# Patient Record
Sex: MALE | URBAN - METROPOLITAN AREA
[De-identification: names, ages, dates, MRNs, and addresses within clinical notes are randomized per-mention and may not be internally consistent; named-entity substitution may affect disease eponyms.]

---

## 2022-05-04 ENCOUNTER — APPOINTMENT (RX ONLY)
Dept: URBAN - METROPOLITAN AREA CLINIC 374 | Facility: CLINIC | Age: 37
Setting detail: DERMATOLOGY
End: 2022-05-04

## 2022-05-04 DIAGNOSIS — L21.8 OTHER SEBORRHEIC DERMATITIS: ICD-10-CM | Status: WORSENING

## 2022-05-04 PROCEDURE — 99204 OFFICE O/P NEW MOD 45 MIN: CPT

## 2022-05-04 PROCEDURE — ? COUNSELING

## 2022-05-04 PROCEDURE — ? RECOMMENDATIONS

## 2022-05-04 PROCEDURE — ? PRESCRIPTION

## 2022-05-04 PROCEDURE — ? MEDICATION COUNSELING

## 2022-05-04 PROCEDURE — ? PRESCRIPTION MEDICATION MANAGEMENT

## 2022-05-04 RX ORDER — HYDROCORTISONE 25 MG/ML
1 LOTION TOPICAL QHS
Qty: 118 | Refills: 2 | Status: ERX | COMMUNITY
Start: 2022-05-04

## 2022-05-04 RX ORDER — KETOCONAZOLE 20 MG/ML
1 SHAMPOO, SUSPENSION TOPICAL
Qty: 120 | Refills: 3 | Status: ERX | COMMUNITY
Start: 2022-05-04

## 2022-05-04 RX ADMIN — HYDROCORTISONE 1: 25 LOTION TOPICAL at 00:00

## 2022-05-04 RX ADMIN — KETOCONAZOLE 1: 20 SHAMPOO, SUSPENSION TOPICAL at 00:00

## 2022-05-04 ASSESSMENT — LOCATION SIMPLE DESCRIPTION DERM
LOCATION SIMPLE: LEFT CHEEK
LOCATION SIMPLE: SCALP
LOCATION SIMPLE: RIGHT CHEEK

## 2022-05-04 ASSESSMENT — LOCATION DETAILED DESCRIPTION DERM
LOCATION DETAILED: LEFT CENTRAL MALAR CHEEK
LOCATION DETAILED: RIGHT CENTRAL MALAR CHEEK
LOCATION DETAILED: LEFT SUPERIOR PARIETAL SCALP

## 2022-05-04 ASSESSMENT — LOCATION ZONE DERM
LOCATION ZONE: SCALP
LOCATION ZONE: FACE

## 2022-05-04 NOTE — PROCEDURE: MEDICATION COUNSELING
Xelwichoz Pregnancy And Lactation Text: This medication is Pregnancy Category D and is not considered safe during pregnancy.  The risk during breast feeding is also uncertain.

## 2022-05-04 NOTE — PROCEDURE: PRESCRIPTION MEDICATION MANAGEMENT
Initiate Treatment: ketoconazole 2 % shampoo: Lather into face and scalp at each wash, let sit for 2-3 minutes then rinse out\\nhydrocortisone 2.5 % lotion: Apply a thin layer to scalp QHS PRN itch
Samples Given: Eucerin and Aveeno Moisturizer
Detail Level: Zone
Render In Strict Bullet Format?: No

## 2022-05-04 NOTE — PROCEDURE: RECOMMENDATIONS
Detail Level: Zone
Recommendation Preamble: The following recommendations were made during the visit:
Recommendations (Free Text): increase hair washing to BIW
Render Risk Assessment In Note?: no

## 2022-09-01 ENCOUNTER — TELEPHONE (OUTPATIENT)
Dept: PSYCHIATRY | Facility: CLINIC | Age: 37
End: 2022-09-01

## 2022-09-01 DIAGNOSIS — F31.12: Primary | ICD-10-CM

## 2022-09-01 RX ORDER — CLONIDINE HYDROCHLORIDE 0.1 MG/1
0.1 TABLET ORAL
Qty: 90 TABLET | Refills: 1 | Status: SHIPPED | OUTPATIENT
Start: 2022-09-01 | End: 2022-10-01

## 2022-09-01 NOTE — TELEPHONE ENCOUNTER
Triage  Pt [facility]) chart was reviewed  H/o bipolar disorder, currently with acute karis  Pt called regarding continued limited sleep for past 3 days  Otherwised tried high dose of Depakote 750mg- slight improvement  In the past tried Clonidine up to 0 3mg to 0 6mg, but was stopped by pharmacy due to high dosing and concerns regarding low BP  Although, Clonidine was effective for sleep  Was advised to retry Clonidine at modest dose of 0 2mg to 0 3mg PRN   Discussed risk vs benefit  Encouraged to monitor vital signs

## 2022-09-06 ENCOUNTER — OFFICE VISIT (OUTPATIENT)
Dept: PSYCHIATRY | Facility: CLINIC | Age: 37
End: 2022-09-06
Payer: COMMERCIAL

## 2022-09-06 VITALS — HEART RATE: 78 BPM | DIASTOLIC BLOOD PRESSURE: 87 MMHG | SYSTOLIC BLOOD PRESSURE: 125 MMHG

## 2022-09-06 DIAGNOSIS — F31.9 BIPOLAR I DISORDER (HCC): Primary | ICD-10-CM

## 2022-09-06 PROCEDURE — 99214 OFFICE O/P EST MOD 30 MIN: CPT | Performed by: PSYCHIATRY & NEUROLOGY

## 2022-09-06 RX ORDER — CLONIDINE HYDROCHLORIDE 0.3 MG/1
TABLET ORAL
Qty: 45 TABLET | Refills: 1 | Status: SHIPPED | OUTPATIENT
Start: 2022-09-06

## 2022-09-06 NOTE — PSYCH
[facility]: [address]  Psychiatric Progress Note    MRN#: [id number]   Jarett Coleman 42 y o  This note was not shared with the patient due to reasonable likelihood of causing patient harm     This Patient was seen in the office today at [place] [address] location  Subjective:  Depakote was increased to 750mg  Less racing thoughts , not as depressed  More calm  Altought on 09/01- had "mental breakdown" , was so anxiety could not lay down and sleep  In general mood is more  Sleep is the problem and anxiety   Mood otherwised more leveled  Interim events  Was instructed to increase Clonidine to 0 3mg for sleep ( history of Clonidine 0 6mg causing low BP)  Today , w/o s e  Less anxious  Jomar had mostly complaint of sleep  Without SI, HI  h/o gastric bypass- hence sensitive to medications  Sleep: gets 1-3 hours of sleep on average, within past 3-4 days  Appetite: stable  Medication side effects:none Trazodone - stomach problem  Seroquel not working  ROS: has boot on left leg- was lisa healing fracture        Mental Status Evaluation:  Appearance:  Nile Babinski is a 42 yr old age, appropriate and casually dressed   Wearing a boot on left leg    Behavior:  pleasant, cooperative, good eye contact, mildly anxious   Speech:  Normal  rate, rhythm, volume, latency, amount   Mood:  normal   Affect:  normal   Thought Process:  normal   Thought Content:  no overt delusions   Perceptual Disturbances: no auditory hallucinations, no visual hallucinations  Delusions  No   Risk Potential: Suicidal Ideations No  Homicidal Ideations No  Potential for Aggression No     Sensorium:  person, place, time/date and situation   Memory:  recent and remote memory grossly intact   Consciousness:  alert and awake   Attention: attention span and concentration are age appropriate   Insight:  age appropriate   Judgment: age appropriate   Gait/Station: normal   Motor Activity: no abnormal movements There were no vitals filed for this visit  Medications:   Current Outpatient Medications on File Prior to Visit   Medication Sig Dispense Refill    cloNIDine (CATAPRES) 0 1 mg tablet Take 1 tablet (0 1 mg total) by mouth daily at bedtime 1-3 tablet by mouth at night, PRN 90 tablet 1     No current facility-administered medications on file prior to visit  Labs: I have personally reviewed all pertinent laboratory/tests results  Most Recent Labs: No results found for: WBC, RBC, HGB, HCT, PLT, RDW, NEUTROABS, SODIUM, K, CL, CO2, BUN, CREATININE, GLUC, GLUF, CALCIUM, AST, ALT, ALKPHOS, TP, ALB, TBILI, CHOLESTEROL, HDL, TRIG, LDLCALC, NONHDLC, VALPROICTOT, CARBAMAZEPIN, LITHIUM, AMMONIA, [id number], FREET4, T3FREE, PREGSERUM, HCG, HCGQUANT, RPR, HGBA1C, EAG    __________________________________________________________________________________________________________    IMPRESSION:  Jomar, 42 yr old [place] ([facility]) male, h/o trauma , depression, anxiety, alcohol dependency , prior inpatient; IOP for addiction, was on Vivitrol  Presents as transfer from addiction outpatient to general mental health  Was off medication due to lapse in treatment  Has finding of significant anxiety with manic like features  Serial [facility] appointment with [facility] finding of pressured speech, anxiety- JN like, energetic; with reports of irritability , lack of sleep, distracted -and mind racing thoughts   Duration in totality about > 2wks  Prior trails of Seroquel - ineffective, Haldol-dystonic  Recently started on Clonidine for sleep    Vitals :   BP: 125/87 , pulse 78      DSM5  Bipolar I disorder, current karis, without psychotic features, moderate severe anxious distress  Panic Disorder      PLAN  Discussed diagnostic findings  WNL labs  ( 08/30/22) CBC, CMP, TSH, Lipids, glucose  Plans to increased Depakote , pending level results  Increased Clonidine to 0 4 to 0 5mg  Medications:   Clonidine 0 1mg  Clonidine  3mg  Depakote ER to 500mg + 250mg for acute karis        Risks, benefits, and possible side effects of medications explained to patient and patient verbalizes understanding  Next Appointment: 1wk      Encounter Duration: Time Spent 20  minutes with Patient   Greater than 50% of total time was spent with the patient

## 2022-09-12 ENCOUNTER — OFFICE VISIT (OUTPATIENT)
Dept: PSYCHIATRY | Facility: CLINIC | Age: 37
End: 2022-09-12
Payer: COMMERCIAL

## 2022-09-12 VITALS — HEART RATE: 64 BPM | SYSTOLIC BLOOD PRESSURE: 102 MMHG | DIASTOLIC BLOOD PRESSURE: 58 MMHG

## 2022-09-12 DIAGNOSIS — F31.9 BIPOLAR I DISORDER (HCC): Primary | ICD-10-CM

## 2022-09-12 PROCEDURE — 99211 OFF/OP EST MAY X REQ PHY/QHP: CPT | Performed by: PSYCHIATRY & NEUROLOGY

## 2022-09-12 PROCEDURE — 99213 OFFICE O/P EST LOW 20 MIN: CPT | Performed by: PSYCHIATRY & NEUROLOGY

## 2022-09-12 RX ORDER — DIVALPROEX SODIUM 500 MG/1
TABLET, DELAYED RELEASE ORAL
Qty: 30 TABLET | Refills: 1 | Status: SHIPPED | OUTPATIENT
Start: 2022-09-12

## 2022-09-12 RX ORDER — DIVALPROEX SODIUM 250 MG/1
TABLET, DELAYED RELEASE ORAL
Qty: 30 TABLET | Refills: 1 | Status: SHIPPED | OUTPATIENT
Start: 2022-09-12

## 2022-09-12 NOTE — PATIENT INSTRUCTIONS
Thanks for presenting to today's appointment  Continue medications as prescribed  Jomar safe travels  Plans are when you return to assess for anxiety , monitor bipolar symptoms and to possible start another drug

## 2022-09-12 NOTE — PSYCH
[facility]: [facility]  [address] [facility]    Psychiatric Progress Note  MRN#: [id number]  Jomar Barahona 42 y o  male        This note was not shared with the patient due to reasonable likelihood of causing patient harm   This Patient was seen in the office today at [address] [facility] location  Subjective:  Jomar is sleeping better  Getting about  3 5 to 4 hrs with Clonidine 0 4  Also very hyper  Thoughts were described as worries- and what if   Very anxious at night, and Restless;  fidgets ( occur for about 1-2 hrs)   Otherwise, deviod of drinking  Reported on some stressors- pending divorce, finalized by end of yr  Also he is traveling to [facility] tomorrow  Plans to return w/in 3 5wks  Interested in starting another bipolar medication    Mental Status Evaluation:   General Appearance:  Rolando Jain is a 42 y o  [place] ([facility])  male age appropriate, casually dressed and nondisheveled    Behavior:  pleasant, cooperative, calm, good eye contact   Speech:  hyper-talkative, pressured speech and WNL tone, volume   Mood:  anxious   Affect:  mood-congruent   Thought Process:  normal and logical   Thought Content:  no overt delusions, normal   Perceptual Disturbances: no auditory hallucinations, no visual hallucinations  Delusions  NA   Risk Potential: Suicidal Ideations No  Homicidal Ideations No  Potential for Aggression No     Sensorium:  person, place, time/date and situation   Memory:  recent and remote memory grossly intact   Consciousness:  alert and awake   Attention: attention span and concentration are age appropriate   Insight:  age appropriate and fair   Judgment: age appropriate and fair   Gait/Station: normal   Motor Activity: no abnormal movements     There were no vitals filed for this visit       Medications:   Current Outpatient Medications on File Prior to Visit   Medication Sig Dispense Refill    cloNIDine (CATAPRES) 0 1 mg tablet Take 1 tablet (0 1 mg total) by mouth daily at bedtime 1-3 tablet by mouth at night, PRN 90 tablet 1    cloNIDine (CATAPRES) 0 3 mg tablet 1- 1 5 tablets by mouth per day at night   Discontinued  Clonidine HCL 0 1mg - sent 09/01 45 tablet 1     No current facility-administered medications on file prior to visit  Labs: I have personally reviewed all pertinent laboratory/tests results  WNL labs  ( 08/30/22) CBC, CMP, TSH, Lipids, glucose  ________________________________________________________________  A/P  Jomar, 42 yr old, [place] ([facility]) male, h/o trauma , depression, anxiety, alcohol dependency , prior inpatient; IOP for addiction, was on Vivitrol  Presents as transfer from addiction outpatient to general mental health  Was off medication due to lapse in treatment  Has findings of significant anxiety with manic like features on serial MHOP appointment (pressured speech, anxiety)- lack of sleep, distracted -and racing thoughts  Duration in totality about > 3wks  Prior trails of Seroquel - ineffective, Haldol-dystonic  , Recently, started on Depakote pending level; also increased Clonidine for sleep     Vital   BP:102/58/ pulse 64    DSM5  Bipolar I disorder, current karis, without psychotic features, moderate severe anxious distress  Panic Disorder    PLANS  Discussed diagnostic findings  Pending Depakote level   Continue medications as prescribed  Depakote ER 500mg+ 250mg  Seroquel 400mg  Clonidine to 0 4 to 0 5mg  Risks, benefits, and possible side effects of medications explained to patient and patient verbalizes understanding  Encounter Duration: Time Spent 20 minutes w/Patient   Greater than 50% of total time was spent with the patient

## 2022-12-14 ENCOUNTER — OFFICE VISIT (OUTPATIENT)
Dept: PSYCHIATRY | Facility: CLINIC | Age: 37
End: 2022-12-14

## 2022-12-14 DIAGNOSIS — F32.A DEPRESSION, UNSPECIFIED DEPRESSION TYPE: ICD-10-CM

## 2022-12-14 DIAGNOSIS — F31.9 BIPOLAR I DISORDER (HCC): Primary | ICD-10-CM

## 2022-12-14 RX ORDER — DIVALPROEX SODIUM 500 MG/1
TABLET, DELAYED RELEASE ORAL
Qty: 30 TABLET | Refills: 1 | Status: SHIPPED | OUTPATIENT
Start: 2022-12-14

## 2022-12-14 RX ORDER — TRAZODONE HYDROCHLORIDE 300 MG/1
TABLET ORAL
Qty: 30 TABLET | Refills: 2 | Status: SHIPPED | OUTPATIENT
Start: 2022-12-14

## 2022-12-14 RX ORDER — DIVALPROEX SODIUM 250 MG/1
TABLET, DELAYED RELEASE ORAL
Qty: 30 TABLET | Refills: 1 | Status: SHIPPED | OUTPATIENT
Start: 2022-12-14

## 2022-12-14 RX ORDER — CLONIDINE HYDROCHLORIDE 0.3 MG/1
TABLET ORAL
Qty: 45 TABLET | Refills: 1 | Status: SHIPPED | OUTPATIENT
Start: 2022-12-14

## 2022-12-14 NOTE — PATIENT INSTRUCTIONS
Aubree Tijerina [id number]   Thanks for presenting to today's Appointment at [facility]  Your medications were not changed

## 2022-12-14 NOTE — PSYCH
[facility]: [facility]  [address] [facility]    Psychiatric Progress Note  MRN#: [id number]  Jomar Barahona 42 y o  male        This note was not shared with the patient due to reasonable likelihood of causing patient harm   This Patient was seen in the office today at [place] [address] location  Subjective:  Jomar was late to appointment via 20mins, hence was seen 1 hr later  Jomar reported on increased anxiety since returning from traveling, described more agitation, feeling not grounded , sleep deprived or too hyper  There's some worries , described as him thinking "about the  next day , the next hr, difficult for him to shut off thoughts, less intense if he's completing tasks and staying busy  Otherwise, stated having limited support system  Mood is depressed  Denied SI, although  thoughts of worthlessness, question if he's valuable to society, and purpose  Sleeping about 6 hrs , thinks medications is effective  althought sometime mid night awaken  Slight paranoia thoughts and questioning if things are real or night, was  vague with details, although later stated having some insight about things that are  He's had all the symptoms x 1 5 months   Also was noticeable , reported family commenting about Jomar speech, that he talks rapidly  He was able to state medications as Depakote 500mg + 250mg takes in morning," Trazodone 300mg- some days effectiveness , noticed more effective if with Clonidine  Did not complete Depakote level    Medication Trails:   History of Xanax 05 in the morning, less agitated   Mental Status Evaluation:   General Appearance:  Tasha Bennett is a 42 y o   [facility]  male age appropriate, casually dressed and nondisheveled    Behavior:  cooperative, intense eye contact, mildly anxious , Restlessness   Speech:  hyper-talkative, pressured speech and WNL tone, volume   Mood:  anxious and depressed   Affect:  slight irritability   Thought Process:  circumstantial, disorganized and logical   Thought Content:  no overt delusions, normal   Perceptual Disturbances: no auditory hallucinations, no visual hallucinations   Does not appear preoccupied or responding  Delusions  NA   Risk Potential: Suicidal Ideations No  Homicidal Ideations No  Potential for Aggression No     Sensorium:  person, place, time/date and situation   Memory:  recent and remote memory grossly intact   Consciousness:  alert and awake   Attention: attention span and concentration are age appropriate   Insight:  fair   Judgment: fair   Gait/Station: normal   Motor Activity: no abnormal movements     There were no vitals filed for this visit  Medications:   Current Outpatient Medications on File Prior to Visit   Medication Sig Dispense Refill   • cloNIDine (CATAPRES) 0 1 mg tablet Take 1 tablet (0 1 mg total) by mouth daily at bedtime 1-3 tablet by mouth at night, PRN 90 tablet 1   • cloNIDine (CATAPRES) 0 3 mg tablet 1- 1 5 tablets by mouth per day at night   Discontinued  Clonidine HCL 0 1mg - sent  45 tablet 1   • divalproex sodium (Depakote) 250 mg EC tablet Divalproex Sodium 250m tablet + 500mg by mouth at night 30 tablet 1   • divalproex sodium (Depakote) 500 mg EC tablet Divalproex Sodium 500m tablet + 250mg tablet by mouth at night 30 tablet 1     No current facility-administered medications on file prior to visit  Labs: I have personally reviewed all pertinent laboratory/tests results   Most Recent Labs: No results found for: WBC, RBC, HGB, HCT, PLT, RDW, NEUTROABS, SODIUM, K, CL, CO2, BUN, CREATININE, GLUC, GLUF, CALCIUM, AST, ALT, ALKPHOS, TP, ALB, TBILI, CHOLESTEROL, HDL, TRIG, LDLCALC, NONHDLC, VALPROICTOT, CARBAMAZEPIN, LITHIUM, AMMONIA, [id number], FREET4, T3FREE, PREGSERUM, HCG, HCGQUANT, RPR, HGBA1C, EAG  Tegretol: No results found for: CARBAMAZEPIN     WNL labs  ( 22) CBC, CMP, TSH, Lipids, glucose     ___________________________________________________________________________________________________________________  A/P  Jomar, 42 yr old, [place] ([facility]) male, h/o trauma , depression, anxiety, alcohol dependency , prior inpatient; IOP for addiction; transfer s/p completion of program  Presented with several bipolar karis symptoms ( pressured, insomnia, distractibility, anxious, irritability , hyper- Note with similar finding on serial appointment at [facility]  Today, also with acute karis and generalized worries   Case complicated , as there depressed mood, hopelessness due to limited supports or job pursuits  Medication compliance to Depakote, Trazodone, and  Clonidine, slight improved sleep       DSM5   Unspecified Depression  Bipolar I disorder, current karis, without psychotic features, moderate severe anxious distress  Panic Disorder    PLANS  Discussed diagnostic impression based on clinical findings  Depakote  mg+ 250 mg   Clonidine to 0 4 to 0 5mg  External Records reviewed in PF prior data based - pending Depakote Level- Was informed to talk to [address] [place] [address] regarding sending labs to [facility]      Risks, benefits, and possible side effects of medications explained to patient and patient verbalizes understanding  Next Appointment: 2 wks    Today's Appointment@ [facility]  Face To Face:  2:37 PM -3:18 PM;Documentation Time:  7:15 PM- 7:33 PM    Encounter Duration: Time Spent 41 minutes with Patient  Greater than 50% of total time was spent with the patient     MDM  Number of Diagnoses or Management Options  Bipolar I disorder ([facility] [address] ), current episode karis, without psychotic feature, with moderate severe anxious distress: established, worsening  Depression, unspecified depression type: new, needed workup     Amount and/or Complexity of Data Reviewed  Clinical lab tests: ordered  Review and summarize past medical records: yes    Risk of Complications, Morbidity, and/or Mortality  Presenting problems: moderate  Management options: moderate

## 2022-12-15 ENCOUNTER — SOCIAL WORK (OUTPATIENT)
Dept: BEHAVIORAL/MENTAL HEALTH CLINIC | Facility: CLINIC | Age: 37
End: 2022-12-15

## 2022-12-15 DIAGNOSIS — F33.1 MAJOR DEPRESSIVE DISORDER, RECURRENT, MODERATE (HCC): Primary | ICD-10-CM

## 2022-12-15 NOTE — PSYCH
Psychotherapy Provided: Individual Psychotherapy 53 minutes     Length of time in session: 53  minutes, follow up in 2 week    Data - 117-210pm - The client said that things are going well, he went to [facility] to visit family, stayed about 2 months, near [place]  He said that he was feeling lonely, said that trip helped him mentally and emotionally, helped his parents and felt like he had a routine and purpose  The client said that he is still living alone, he said that he feels like his living situation is not ideal   The client said that he feels like he wakes up in a panic about 2x per week  Worries about his to do list feels like his list is not moving along, things not getting accomplished which produces more negative emotions  Assessment - The client was cooperative during the session and answered therapist’s questions with little prompting  The therapist assessed for self-harm behavior and ideation, there appeared to be none  Plan - The client will schedule future sessions  He will continue to work on understanding his diagnosis, continue to understand how his thoughts influence his emotions, work on building positive self-esteem and self-confidence, and work on reducing negative and unhealthy thoughts, patterns and learning to choose more healthy responses and behaviors, use and improve his coping skills, challenge negative thoughts, practice reframing, and continue to work on improving positive and effective communication to reduce and resolve conflict  No diagnosis found  Goals addressed in session: Goal 1     Pain:      none    0    Current suicide risk : Moderate    3    Behavioral Health Treatment Plan  Luke: Diagnosis and Treatment Plan explained to Debby Brownlee relates understanding diagnosis and is agreeable to Treatment Plan   Yes     Visit start and stop times:    12/15/22

## 2022-12-16 PROBLEM — F33.1 MAJOR DEPRESSIVE DISORDER, RECURRENT, MODERATE (HCC): Status: ACTIVE | Noted: 2022-12-16

## 2023-07-05 ENCOUNTER — TELEPHONE (OUTPATIENT)
Dept: PSYCHIATRY | Facility: CLINIC | Age: 38
End: 2023-07-05

## 2023-07-07 ENCOUNTER — TELEPHONE (OUTPATIENT)
Dept: PSYCHIATRY | Facility: CLINIC | Age: 38
End: 2023-07-07

## 2023-07-07 NOTE — TELEPHONE ENCOUNTER
Patient called to inquire about scheduling appt with Psychiatry. Writer advised that the doctor's schedule is currently not available as she is returning from leave. Routed message to MR for scheduling once the doctor's schedule is available.

## 2023-07-11 ENCOUNTER — DOCUMENTATION (OUTPATIENT)
Dept: BEHAVIORAL/MENTAL HEALTH CLINIC | Facility: CLINIC | Age: 38
End: 2023-07-11

## 2023-07-11 ENCOUNTER — TELEPHONE (OUTPATIENT)
Dept: PSYCHIATRY | Facility: CLINIC | Age: 38
End: 2023-07-11

## 2023-07-11 NOTE — PROGRESS NOTES
[address] [facility]    Patient Name Tejinder Stone     Date of Birth: 34 y.o. 1985      MRN: [id number]    Admission Date: several months ago    Date of Transfer: July 11, 2023    Admission Diagnosis:     1. Major Depressive Disorder    Current Diagnosis:     No diagnosis found. Reason for Admission: Jomar presented for treatment due to depressive symptoms. Primary complaints included DEPRESSIVE SYMPTOMS: depressed mood. Sadness, lack of motivation. Progress in Treatment: Jomar was seen for Cognitive Behavioral Therapy. During the course of treatment he NA. Episodes of Higher Level of Care: No    Transfer request Initiated by: Psychiatrist: None Therapist: None    Reason for Transfer Request: patient requested transfer    Does this individual need a clinician with specialized training/expertise?: No    Is this client working with any other [facility] Providers/Therapists?  Psychiatrist: None and Dr. Dr. Carla Dasilva Therapist: None    Other pertinent issues: None    Are there any specific individuals who would be a “best fit” or who have already agreed to accept this transfer request?      Psychiatrist: None   Therapist: None  Rationale: Not Applicable    Attempts to maintain the current therapeutic relationship: Yes, but patient insists on transfer    Transfer request routed to Clinical Supervisor for input and/or approval.     Comments from other involved providers and/or clinical coordinator: None    Kayli Stanley, PC07/11/23

## 2023-07-11 NOTE — TELEPHONE ENCOUNTER
Client requested transfer from [facility]. Rec'd transfer summary documentation.  Scheduled KRISTINA with Joe Tuttle for 7/12 at 10am.

## 2023-07-12 ENCOUNTER — OFFICE VISIT (OUTPATIENT)
Dept: PSYCHIATRY | Facility: CLINIC | Age: 38
End: 2023-07-12
Payer: COMMERCIAL

## 2023-07-12 ENCOUNTER — SOCIAL WORK (OUTPATIENT)
Dept: BEHAVIORAL/MENTAL HEALTH CLINIC | Facility: CLINIC | Age: 38
End: 2023-07-12
Payer: COMMERCIAL

## 2023-07-12 DIAGNOSIS — F32.A DEPRESSION, UNSPECIFIED DEPRESSION TYPE: ICD-10-CM

## 2023-07-12 DIAGNOSIS — F41.9 ANXIETY DISORDER, UNSPECIFIED TYPE: Primary | ICD-10-CM

## 2023-07-12 DIAGNOSIS — F31.9 BIPOLAR I DISORDER (HCC): ICD-10-CM

## 2023-07-12 DIAGNOSIS — Z79.899 ENCOUNTER FOR LONG-TERM (CURRENT) USE OF OTHER MEDICATIONS: Primary | ICD-10-CM

## 2023-07-12 DIAGNOSIS — F43.20 ADJUSTMENT DISORDER, UNSPECIFIED TYPE: ICD-10-CM

## 2023-07-12 DIAGNOSIS — G47.9 SLEEP DIFFICULTIES: ICD-10-CM

## 2023-07-12 PROCEDURE — 99213 OFFICE O/P EST LOW 20 MIN: CPT | Performed by: PSYCHIATRY & NEUROLOGY

## 2023-07-12 PROCEDURE — 90834 PSYTX W PT 45 MINUTES: CPT

## 2023-07-12 RX ORDER — TRAZODONE HYDROCHLORIDE 300 MG/1
TABLET ORAL
Qty: 30 TABLET | Refills: 2 | Status: SHIPPED | OUTPATIENT
Start: 2023-07-12

## 2023-07-12 RX ORDER — DIVALPROEX SODIUM 500 MG/1
TABLET, DELAYED RELEASE ORAL
Qty: 30 TABLET | Refills: 1 | Status: SHIPPED | OUTPATIENT
Start: 2023-07-12

## 2023-07-12 RX ORDER — DIVALPROEX SODIUM 250 MG/1
TABLET, DELAYED RELEASE ORAL
Qty: 30 TABLET | Refills: 1 | Status: SHIPPED | OUTPATIENT
Start: 2023-07-12

## 2023-07-12 RX ORDER — LURASIDONE HYDROCHLORIDE 20 MG/1
20 TABLET, FILM COATED ORAL
Qty: 30 TABLET | Refills: 1 | Status: SHIPPED | OUTPATIENT
Start: 2023-07-12 | End: 2023-07-13 | Stop reason: SDUPTHER

## 2023-07-12 NOTE — PSYCH
[facility]: [facility], [address] [facility]      Psychiatric Progress Note  MRN#: [id number]  Jomar Barahona 34 y.o. male  This note was not shared with the patient due to reasonable likelihood of causing patient harm   _________________________________________________________________________________________________________________________________  OFFICE APPOINTMENT   Patient was seen today at [address] [facility] location                                                Patient López Cortez ,1985   Prescriber/Physician: Rossy Nunn DO Physician Location:   [address] [facility] [address] [place] [address]     • This service was provided in the office. Patient is currently located in the [place], where I am  licensed. • Patient gave consent to proceed with encounter; acknowledge understanding of security and privacy of encounter   • Patient verbalized understanding evaluation only involves Psychiatric diagnosing, prescribing, result monitoring   • Patient was informed this is a billable service  ___________________________________________________________________________________________________________________________________         Subjective:  Jomar reported recent travel to [facility]. Then stopped Clonidine as was not available , was started on Klonopin . 25mg for anxiety, which he has discontinued ~ 2 wks ago. Currently asymptomatic. Otherwise reported intermittent anxiety shaking, drug throat, racing thought" and identifiable triggers ( airplanes, public places or while alone). Disclosed depression and lack of sleep as  triggers of anxiety and worries- about loneliness. . Last occurred 2 wks ago, "hopelessness- "why bother", not wanting to eat, multiple days- he did not want to get out of bed. Denies SI, HI. Denies bipolar symptoms since stable dose of Depakote. Medications:  Compliance to Depakote 750mg, Trazodone 300mg. Medication Trails:  Klonopin . 25mg  Clonidine- low BP   Quetiapine -could not sleep  Wellbutrin- " on for a few months- was not working"  Prozac- was not working   Lexapro- worse anxiety       Mental Status Evaluation:   General Appearance:  Dorman Fleischer is a 34 y.o. [place]  male age appropriate, casually dressed and nondisheveled    Behavior:  cooperative, intense eye contact, mildly anxious    Speech:  mildly pressured and WNL tone, volume   Mood:  "stable since Depakote"    Affect:  Slight anxious    Thought Process:  Linear and logical   Thought Content:  no overt delusions, normal   Perceptual Disturbances: no auditory hallucinations, no visual hallucinations . Does not appear preoccupied or responding  Delusions  NA   Risk Potential: Suicidal Ideations No  Homicidal Ideations No  Potential for Aggression No     Sensorium:  person, place, time/date and situation   Memory:  recent and remote memory grossly intact   Consciousness:  alert and awake   Attention: attention span and concentration are age appropriate   Insight:  fair   Judgment: fair   Gait/Station: normal   Motor Activity: no abnormal movements     There were no vitals filed for this visit. Medications:     Current Outpatient Medications on File Prior to Visit   Medication Sig Dispense Refill   • cloNIDine (CATAPRES) 0.1 mg tablet Take 1 tablet (0.1 mg total) by mouth daily at bedtime 1-3 tablet by mouth at night, PRN 90 tablet 1   • cloNIDine (CATAPRES) 0.3 mg tablet Clonidine 0.3m- 1.5 tablets by mouth per day at night .  45 tablet 1   • divalproex sodium (Depakote) 250 mg EC tablet Divalproex Sodium 250m tablet + 500mg po at night 30 tablet 1   • divalproex sodium (Depakote) 500 mg EC tablet Divalproex Sodium 500m tablet + 250mg tablet po at night 30 tablet 1   • traZODone (DESYREL) 300 MG tablet Trazodone 300m tablet po at night 30 tablet 2     No current facility-administered medications on file prior to visit. Labs        WNL labs  ( 22) CBC, CMP, TSH, Lipids, glucose     ___________________________________________________________________________________________________________________  A/P  Jomar, 34 yr old, [place] male, h/o trauma , depression, anxiety, alcohol dependency , prior inpatient; IOP for addiction; transfer s/p completion of program. Presented with several bipolar karis symptoms ( pressured, insomnia, distractibility, anxious, irritability , hyper- Note with similar finding on serial appointment at [facility]. Today,less karis since Depakote increased, otherwise intermittent depression- not meeting full criteria.       DSM5   Unspecified Depression  Bipolar I disorder, current karis, without psychotic features, moderate severe anxious distress  Panic Disorder    PLANS  Discussed diagnostic impression based on clinical findings  Started Latuda 20mg  Continue Depakote  mg+ 250 mg   Continue Trazodone  D/C Clonidine to 0.4 to 0.5mg. PRN      Current Medications Ordered :   •  divalproex sodium (Depakote) 250 mg DR tablet, Divalproex Sodium 250m tablet + 500mg po at night, Disp: 30 tablet, Rfl: 1  •  divalproex sodium (Depakote) 500 mg DR tablet, Divalproex Sodium 500m tablet + 250mg tablet po at night, Disp: 30 tablet, Rfl: 1  •  lurasidone (Latuda) 20 mg tablet, 1 tablet daily with dinner, Disp: 30 tablet, Rfl: 1  •  traZODone (DESYREL) 300 MG tablet, Trazodone 300m tablet po at night, Disp: 30 tablet, Rfl: 2    Orders Placed This Encounter   Procedures   • CBC and differential     This is a patient instruction: This test is non-fasting. Please drink two glasses of water morning of bloodwork. Standing Status:   Future     Number of Occurrences:   1     Standing Expiration Date:   10/12/2023   • Comprehensive metabolic panel     This is a patient instruction: Patient fasting for 8 hours or longer recommended. Standing Status:   Future     Number of Occurrences:   1     Standing Expiration Date:   7/12/2024   • Lipid panel     This is a patient instruction: This test requires patient fasting for 10-12 hours or longer. Drinking of black coffee or black tea is acceptable. Standing Status:   Future     Number of Occurrences:   1     Standing Expiration Date:   10/12/2023   • Ammonia     This is a patient instruction: This test can ONLY be collected at a [facility]. Cannot be collected at the [facility] campus location. Standing Status:   Future     Number of Occurrences:   1     Standing Expiration Date:   10/12/2023   • Valproic acid level, total     Trough level : get level in the morning     Standing Status:   Future     Number of Occurrences:   1     Standing Expiration Date:   1/12/2024         Risks, benefits, and possible side effects of medications explained to patient and patient verbalizes understanding. Next Appointment: 6 wks    [facility]  Face To Face:  11:30 AM -11:50AM;Documentation Time:  12:38 PM- 12:56 PM    Encounter Duration: Time Spent 20 minutes with Patient. Greater than 50% of total time was spent with the patient     MDM  Number of Diagnoses or Management Options  Bipolar I disorder ([address]), current episode karis, without psychotic feature, with moderate severe anxious distress: established, improving  Depression, unspecified depression type: established, worsening  Encounter for long-term (current) use of other medications: new, no workup     Amount and/or Complexity of Data Reviewed  Clinical lab tests: ordered    Risk of Complications, Morbidity, and/or Mortality  Presenting problems: moderate  Diagnostic procedures: moderate  Management options: moderate

## 2023-07-12 NOTE — PATIENT INSTRUCTIONS
Inessa Nguyen [phone] Thanks for presenting to today's Appointment at [address] and Your medications were changed as listed - started [place]

## 2023-07-13 ENCOUNTER — TELEPHONE (OUTPATIENT)
Dept: PSYCHIATRY | Facility: CLINIC | Age: 38
End: 2023-07-13

## 2023-07-13 DIAGNOSIS — F31.9 BIPOLAR I DISORDER (HCC): ICD-10-CM

## 2023-07-13 DIAGNOSIS — F32.A DEPRESSION, UNSPECIFIED DEPRESSION TYPE: ICD-10-CM

## 2023-07-13 RX ORDER — LURASIDONE HYDROCHLORIDE 20 MG/1
TABLET, FILM COATED ORAL
Qty: 30 TABLET | Refills: 1 | Status: SHIPPED | OUTPATIENT
Start: 2023-07-13

## 2023-07-13 NOTE — TELEPHONE ENCOUNTER
Pharmacy calling regarding lurasidone script with conflicting directions. Script states both to take 1 in the morning with breakfast and 1 with dinner. Pharmacy states they wont fill the script until they get clarification on which set of directions is correct.

## 2023-07-18 NOTE — PSYCH
Behavioral Health Psychotherapy Progress Note    Psychotherapy Provided: Individual Psychotherapy     1. Major depressive disorder, recurrent, moderate (HCC)        2. Adjustment disorder, unspecified type            DATA: Client identified feeling overwhelmed by anxiety in the evening accompanied by difficulty falling asleep. The client stated that he is looking for support with managing his anxiety and improving his sleep quality. The client identified that his anxiety activates when he is winding down from his day, and it presents itself through racing thoughts. Client identified that keeping busy helps limit his anxious symptoms and increases his productivity. He identified that help from the therapist by setting goals for each session has helped in the past.   During this session, this clinician used the following therapeutic modalities: Engagement Strategies, Cognitive Behavioral Therapy, Motivational Interviewing and Supportive Psychotherapy    Substance Abuse was not addressed during this session. If the client is diagnosed with a co-occurring substance use disorder, please indicate any changes in the frequency or amount of use: none. Stage of change for addressing substance use diagnoses: No substance use/Not applicable    ASSESSMENT:  López Cortez presents with a Anxious and tired mood. his affect is Constricted, which is congruent, with his mood and the content of the session. López Cortez presents with a minimal risk of suicide, minimal risk of self-harm, and minimal risk of harm to others. For any risk assessment that surpasses a "low" rating, a safety plan must be developed. A safety plan was indicated: no  If yes, describe in detail: none    PLAN: Between sessions, López Cortez will review psychoeducation on anxiety and goal setting.  At the next session, the therapist will use Engagement Strategies, Cognitive Behavioral Therapy, Motivational Interviewing and Supportive Psychotherapy to develop initial treatment plan with client.     This note was not shared with the patient due to this is a psychotherapy note  Visit start and stop times:    07/18/23  Start Time: 1005  Stop Time: 1053  Total Visit Time: 48 minutes

## 2023-07-24 ENCOUNTER — TELEPHONE (OUTPATIENT)
Dept: PSYCHIATRY | Facility: CLINIC | Age: 38
End: 2023-07-24

## 2023-08-02 ENCOUNTER — SOCIAL WORK (OUTPATIENT)
Dept: BEHAVIORAL/MENTAL HEALTH CLINIC | Facility: CLINIC | Age: 38
End: 2023-08-02
Payer: COMMERCIAL

## 2023-08-02 ENCOUNTER — TELEPHONE (OUTPATIENT)
Dept: PSYCHIATRY | Facility: CLINIC | Age: 38
End: 2023-08-02

## 2023-08-02 DIAGNOSIS — F41.9 ANXIETY DISORDER, UNSPECIFIED TYPE: ICD-10-CM

## 2023-08-02 DIAGNOSIS — G47.9 SLEEP DIFFICULTIES: ICD-10-CM

## 2023-08-02 DIAGNOSIS — F43.20 ADJUSTMENT DISORDER, UNSPECIFIED TYPE: Primary | ICD-10-CM

## 2023-08-02 PROCEDURE — 90834 PSYTX W PT 45 MINUTES: CPT

## 2023-08-02 NOTE — PSYCH
Behavioral Health Psychotherapy Progress Note    Psychotherapy Provided: Individual Psychotherapy     1. Adjustment disorder, unspecified type        2. Anxiety disorder, unspecified type        3. Sleep difficulties            DATA: Session focused on background information on his continued recovery from alcohol, the conditions to living in the recovery house. Client provided background information on his history of low self esteem, anxiety, and the importance of socialization in his life. Client identified his primary goals for his initial treatment plan were to improve his self esteem and reduce his social anxiety symptoms. During this session, this clinician used the following therapeutic modalities: Engagement Strategies, Cognitive Behavioral Therapy, Motivational Interviewing and Supportive Psychotherapy    Substance Abuse was not addressed during this session. If the client is diagnosed with a co-occurring substance use disorder, please indicate any changes in the frequency or amount of use: None. Stage of change for addressing substance use diagnoses: Maintenance    ASSESSMENT:  Anna Montilla presents with a Euthymic/ normal mood. his affect is Normal range and intensity, which is congruent, with his mood and the content of the session. Anna Montilla presents with a minimal risk of suicide, minimal risk of self-harm, and minimal risk of harm to others. For any risk assessment that surpasses a "low" rating, a safety plan must be developed. A safety plan was indicated: No    If yes, describe in detail N/A    PLAN: Between sessions, Anna Montilla will set a daily schedule to help him to focus on accomplishing his employment goal. At the next session, the therapist will use Engagement Strategies, Cognitive Behavioral Therapy, Motivational Interviewing and Supportive Psychotherapy to address treatment plan goals improving his self esteem and socializing.     This note was not shared with the patient due to this is a psychotherapy note    Visit start and stop times:    08/02/23  Start Time: 1505  Stop Time: 1555  Total Visit Time: 50 minutes

## 2023-08-02 NOTE — TELEPHONE ENCOUNTER
Signed ANDRADE for :    [address]   [address] [place] [address] [address] [id number]  P: [phone]    April Artemio Carrel

## 2023-08-02 NOTE — BH TREATMENT PLAN
[address] [address]  1985     Date of Initial Psychotherapy Assessment: 07/11/2023  Date of Current Treatment Plan: 08/02/23  Treatment Plan Target Date: 02/01/2024  Treatment Plan Expiration Date: 02/01/2024    Diagnosis:   1. Adjustment disorder, unspecified type        2. Anxiety disorder, unspecified type        3. Sleep difficulties            Area(s) of Need: improved self esteem, emotional regulation, and coping skills    Long Term Goal 1 (in the client's own words): " I want to understand my anxiety and learn how to independently manage my emotions."    Stage of Change: Preparation    Target Date for completion: 02/01/2023     Anticipated therapeutic modalities: cognitive behavioral therapy, motivational interviewing, dialectical behavioral therapy, exposure therapy, and behavioral modification. People identified to complete this goal: Jomar Barahona (client)    Short Term Goal 1: Verbalize an accurate understanding of the vicious cycle of social anxiety and avoidance. Objective 1: (identify the means of measuring success in meeting the objective): Discuss with client how social anxiety derives from cognitive biases that overestimate negative evaluation by others, undervalue the self, distress, and often lead to unnecessary avoidances. Objective 2: (identify the means of measuring success in meeting the objective): Assign the client psychoeducation on social anxiety that explains the cycle of social anxiety and avoidance and the rationale for cognitive behavioral treatment.       I am currently under the care of a [facility] psychiatric provider: yes    My [facility] psychiatric provider is: Dr. Yuki Rosales    I am currently taking psychiatric medications: Yes, as prescribed    I feel that I will be ready for discharge from mental health care when I reach the following (measurable goal/objective): understand the causes and triggers for social anxiety and learn effective coping skills. For children and adults who have a legal guardian:   Has there been any change to custody orders and/or guardianship status? NA. If yes, attach updated documentation. I have created my Crisis Plan and have been offered a copy of this plan    [address]: Diagnosis and Treatment Plan explained to [employer] acknowledges an understanding of their diagnosis. Axel Doan agrees to this treatment plan.     I have been offered a copy of this Treatment Plan. yes

## 2023-08-08 ENCOUNTER — TELEPHONE (OUTPATIENT)
Dept: PSYCHIATRY | Facility: CLINIC | Age: 38
End: 2023-08-08

## 2023-08-08 NOTE — PATIENT INSTRUCTIONS
Ethyl Graves [id number]   Thanks for presenting to today's Appointment at [facility]   Depakote was increased to 1000mg at night  Yusra Mary was increased to 40mg     Your other medications were not changed     Also labs order regarding Depakote level - to complete prior to next appointment at [facility]

## 2023-08-08 NOTE — TELEPHONE ENCOUNTER
Zain Barahona , 1985 , [facility] chart was reviewed. WNL labs CBC, CMP, lipids , low Depakote level .    Patient was called, left message informing him to call to discuss Depakote level and scheduling earlier appointment at [address] [place] [address] This note was not shared with the patient due to reasonable likelihood of causing patient harm

## 2023-08-08 NOTE — PSYCH
[facility]: [facility]- Non Addiciton  [facility], [address] [facility]      Psychiatric Progress Note  MRN#: [id number]  Jomar Barahona 34 y.o. male    This note was not shared with the patient due to reasonable likelihood of causing patient harm  _________________________________________________________________________________________________________________________________  OFFICE APPOINTMENT   Patient was seen today at [address] [facility] location                                                Patient David Tam ,1985   Prescriber/Physician: Toi Alicia DO Physician Location:   [address]     • This service was provided in the office. Patient is currently located in the [place], where I am  licensed. • Patient gave consent to proceed with encounter; acknowledge understanding of security and privacy of encounter   • Patient identity was verified as well as the [facility] chart  • Patient verbalized understanding evaluation only involves Psychiatric diagnosing, prescribing, result monitoring   • Patient was informed this is a billable service  ___________________________________________________________________________________________________________________________________       David Tam- arrived at 1:45PM, 15 mins late to appointment    Subjective:  Jomar reported on anxiety otherwise he's having difficulties sleep, able to fall to sleep w/trazodone , although can't maintain sleep. He also started Latuada  20mg, devoid of side effect, although not sure if its working      ROS:   Anxiety- defer to HPI  SI/Hi- he denies     Medical ROS: Defer to HPI for pertinent positive/findings , otherwise negative       Medication Trails:  Klonopin . 25mg  Clonidine- low BP   Quetiapine -could not sleep  Wellbutrin- " on for a few months- was not working"  Prozac- was not working   Lexapro- worse anxiety       Mental Status Evaluation:   General Appearance:  Inessa Nguyen, is a 34 y.o.. [place]  male age appropriate, casually dressed and nondisheveled    Behavior:  cooperative, normal  eye contact,   Speech:  less pressured and WNL tone, volume   Mood:  Anxious    Affect:  Slight anxious    Thought Process:  Linear and logical   Thought Content:  no overt delusions, normal   Perceptual Disturbances:  Does not appear preoccupied or responding  Delusions  NA   Risk Potential: Suicidal Ideations No  Homicidal Ideations No  Potential for Aggression No     Sensorium:  person, place, time/date and situation   Memory:  recent and remote memory grossly intact   Consciousness:  alert and awake   Attention: attention span and concentration are age appropriate   Insight:  fair   Judgment: fair   Gait/Station: normal   Motor Activity: no abnormal movements     There were no vitals filed for this visit. Medications:     Current Outpatient Medications on File Prior to Visit   Medication Sig Dispense Refill   • cloNIDine (CATAPRES) 0.1 mg tablet Take 1 tablet (0.1 mg total) by mouth daily at bedtime 1-3 tablet by mouth at night, PRN 90 tablet 1   • cloNIDine (CATAPRES) 0.3 mg tablet Clonidine 0.3m- 1.5 tablets by mouth per day at night . 45 tablet 1   • divalproex sodium (Depakote) 250 mg EC tablet Divalproex Sodium 250m tablet + 500mg po at night 30 tablet 1   • divalproex sodium (Depakote) 500 mg EC tablet Divalproex Sodium 500m tablet + 250mg tablet po at night 30 tablet 1   • traZODone (DESYREL) 300 MG tablet Trazodone 300m tablet po at night 30 tablet 2     No current facility-administered medications on file prior to visit.       Labs   [facility]- paper form- reviewed, also in [facility] prior chart   WNL 2023- WNL CBC , CMP, lipids, Low Depakote ___________________________________________________________________________________________________________________  A/P  Amelia Roque, is a 34 y.o., [place] male, h/o trauma , depression, anxiety, alcohol dependency , prior inpatient; IOP for addiction (alcohol)-completion of program.  Interim events of several bipolar karis , depression and anxiety . Slight positive recently started [facility] [place] , recently with low Depakote      DSM5   Bipolar I disorder, current karis, without psychotic features, moderate severe anxious distress  Panic Disorder  Unspecified Depression    PLANS  History , external Record. Discussed clinical findings and diagnotic  impression  Regarding anxiety   Low Depakote level- hence increased Depakote to 1000mg  Increased Latuda to 20mg  Did not change patient other medications ; Trazodone 300mg  Complaints regarding sleep= he's prescribed also gabapentin 300mg- was advised to try 900mg     Labs Ordered During Encounter at [facility]:  -     Valproic acid level - trouble with printing lab form to [address] [place] [address] labs, hence lab script was mailed to pt address on file     Medications Prescribed During Encounter at [facility]:    •  divalproex sodium (Depakote) 500 mg DR tablet, Divalproex Sodium 500m tablets po at night, Disp: 60 tablet, Rfl: 1  •  lurasidone (Latuda) 40 mg tablet, Lurasidone 40m tablet daily with dinner, Disp: 30 tablet, Rfl: 1          Risks, benefits, and possible side effects of medications explained to patient and patient verbalizes understanding. Next Appointment: 4-6 wk    Today's Appointment@ [facility]  Face To Face:  11:30 AM -11:50AM;Documentation Time:  3:40PM-4:08PM    Encounter Duration: Time Spent 20minutes with Patient. Greater than 50% of total time was spent with the patient           MDM  Number of Diagnoses or Management Options  Bipolar I disorder ([address]), current episode karis, without psychotic feature, with moderate severe anxious distress: established, worsening  Depression, unspecified depression type: established, improving     Amount and/or Complexity of Data Reviewed  Clinical lab tests: reviewed and ordered  Review and summarize past medical records: yes    Risk of Complications, Morbidity, and/or Mortality  Presenting problems: moderate  Diagnostic procedures: moderate  Management options: moderate

## 2023-08-11 ENCOUNTER — OFFICE VISIT (OUTPATIENT)
Dept: PSYCHIATRY | Facility: CLINIC | Age: 38
End: 2023-08-11
Payer: COMMERCIAL

## 2023-08-11 DIAGNOSIS — F31.9 BIPOLAR I DISORDER (HCC): Primary | ICD-10-CM

## 2023-08-11 DIAGNOSIS — Z79.899 ENCOUNTER FOR LONG-TERM (CURRENT) USE OF OTHER MEDICATIONS: ICD-10-CM

## 2023-08-11 DIAGNOSIS — F32.A DEPRESSION, UNSPECIFIED DEPRESSION TYPE: ICD-10-CM

## 2023-08-11 PROCEDURE — 99214 OFFICE O/P EST MOD 30 MIN: CPT | Performed by: PSYCHIATRY & NEUROLOGY

## 2023-08-11 RX ORDER — LURASIDONE HYDROCHLORIDE 40 MG/1
TABLET, FILM COATED ORAL
Qty: 30 TABLET | Refills: 1 | Status: SHIPPED | OUTPATIENT
Start: 2023-08-11

## 2023-08-11 RX ORDER — DIVALPROEX SODIUM 500 MG/1
TABLET, DELAYED RELEASE ORAL
Qty: 60 TABLET | Refills: 1 | Status: SHIPPED | OUTPATIENT
Start: 2023-08-11

## 2023-08-16 ENCOUNTER — SOCIAL WORK (OUTPATIENT)
Dept: BEHAVIORAL/MENTAL HEALTH CLINIC | Facility: CLINIC | Age: 38
End: 2023-08-16
Payer: COMMERCIAL

## 2023-08-16 DIAGNOSIS — F43.20 ADJUSTMENT DISORDER, UNSPECIFIED TYPE: Primary | ICD-10-CM

## 2023-08-16 PROCEDURE — 90832 PSYTX W PT 30 MINUTES: CPT

## 2023-08-16 NOTE — PSYCH
Behavioral Health Psychotherapy Progress Note    Psychotherapy Provided: Individual Psychotherapy     1. Adjustment disorder, unspecified type            Goals addressed in session: Goal 1     DATA: Session focused on the client's mental health and well being. Client stated that he started keep a planner to create a daily routine and to stay on task with his goals. He stated that he had several high priority goals including getting a passport, locating employment, joining a gym, and getting a hip replacement. The clinician offered support in setting up goals and making a daily routine. During this session, this clinician used the following therapeutic modalities: Engagement Strategies, Client-centered Therapy, Cognitive Behavioral Therapy, Motivational Interviewing and Supportive Psychotherapy    Substance Abuse was not addressed during this session. If the client is diagnosed with a co-occurring substance use disorder, please indicate any changes in the frequency or amount of use: None. Stage of change for addressing substance use diagnoses: No substance use/Not applicable    ASSESSMENT:  Tim Cm presents with a Euthymic/ normal mood. his affect is Normal range and intensity, which is congruent, with his mood and the content of the session. The client has made progress on their goals. Tim Cm presents with a minimal risk of suicide, minimal risk of self-harm, and minimal risk of harm to others. For any risk assessment that surpasses a "low" rating, a safety plan must be developed.     A safety plan was indicated: no  If yes, describe in detail N/A    PLAN: Between sessions, Tim Cm will reflect on the session and continue to keep a daily routine with a priority list. At the next session, the therapist will use Engagement Strategies, Client-centered Therapy, Cognitive Behavioral Therapy, Motivational Interviewing and Supportive Psychotherapy to address depression, goal setting, and emotional regulation. Behavioral Health Treatment Plan and Discharge Planning: Reza Rao is aware of and agrees to continue to work on their treatment plan. They have identified and are working toward their discharge goals.  yes    This note was not shared with the patient due to this is a psychotherapy note    Visit start and stop times:    08/16/23  Start Time: 1515  Stop Time: 1545  Total Visit Time: 30 minutes

## 2023-08-25 ENCOUNTER — OFFICE VISIT (OUTPATIENT)
Dept: PSYCHIATRY | Facility: CLINIC | Age: 38
End: 2023-08-25
Payer: COMMERCIAL

## 2023-08-25 DIAGNOSIS — F41.1 GENERALIZED ANXIETY DISORDER: ICD-10-CM

## 2023-08-25 DIAGNOSIS — F31.13 BIPOLAR DISORDER, CURRENT EPISODE MANIC WITHOUT PSYCHOTIC FEATURES, SEVERE (HCC): Primary | ICD-10-CM

## 2023-08-25 PROCEDURE — 99214 OFFICE O/P EST MOD 30 MIN: CPT | Performed by: PSYCHIATRY & NEUROLOGY

## 2023-08-25 RX ORDER — HALOPERIDOL 5 MG/1
TABLET ORAL
Qty: 60 TABLET | Refills: 1 | Status: SHIPPED | OUTPATIENT
Start: 2023-08-25

## 2023-08-25 RX ORDER — GABAPENTIN 600 MG/1
TABLET ORAL
Qty: 30 TABLET | Refills: 2 | Status: SHIPPED | OUTPATIENT
Start: 2023-08-25

## 2023-08-25 NOTE — PSYCH
[facility]: [facility]- Non Addiciton  [facility], [address]      Psychiatric Progress Note  MRN#: [id number]  Jomar Barahona 34 y.o. male    This note was not shared with the patient due to reasonable likelihood of causing patient harm   _________________________________________________________________________________________________________________________________  OFFICE APPOINTMENT   Seen today at [address] [facility] location                                                Patient Randell Ames ,1985   Prescriber/Physician: Delon Fothergill, DO Physician Location:   [address] [facility] [id number]     • This service was provided in the office. Patient is currently located in the [place], where I am  licensed. • Patient gave consent to proceed with encounter; acknowledge understanding of security and privacy of encounter   • Patient identity was verified as well as the [facility] chart  • Patient verbalized understanding evaluation only involves Psychiatric diagnosing, prescribing, result monitoring   • Patient was informed this is a billable service and legal  ___________________________________________________________________________________________________________________________________       Subjective:  He complained of sleep, not sure as to why, " not sure if its anxiety. He increased Depakote to 1000mg - have not noticed a difference in his mood. Showed labs on his cell phone - 08/21/23- level of 70. He also complained that Trazodone does not work totally, wears off half thru the night. There's nighttime restlessness , anxious thoughts also. Stated when he recently traveled to [facility] that the mother ( Grace Ware) noticed he's not himself. Told Jomar to calm down, relax , thinks are okay.  He acknowledged worrying a lot about things in the world, "stress, and that he's unemployed, although looking for work. Stated sometimes feeling useless. Otherwise , there's plans for him to travel to [facility] in about 1 month for total hip replacement, complained of difficulties with gait and several falls, problems with stairs and walking. Stated his family is in [facility] and there's supports there to assist in his recovery      ROS:   Anxiety- defer to HPI  Sleep - low  Depression- defer to HPI  SI/Hi- denies    Medical ROS: Defer to HPI for pertinent positive/findings , otherwise negative       Medication Trails:  Klonopin . 25mg  Clonidine- low BP   Quetiapine -could not sleep  Wellbutrin- " on for a few months- was not working"  Prozac- was not working   Lexapro- worse anxiety       Mental Status Evaluation:   General Appearance:  Randell Ames, is a 34 y.o.. [place]  male looks stated age appropriately dressed,  and nondisheveled    Behavior:  cooperative, normal,  eye contact, moderate anxious appearing    Speech:  Pressured  and WNL tone, volume,   Mood:  Depressed , anxious    Affect:  Anxious    Thought Process:  Logical , circumstantial to tangential    Thought Content:  no overt delusions, normal,   Perceptual Disturbances:  Does not appear preoccupied or responding  Delusions  NA   Risk Potential: Suicidal Ideations w/o  Homicidal Ideations w/o  Potential for Aggression No     Sensorium:  person, place, time/date and situation  (location [facility], month August, year 2023)   Memory:  recent and remote memory grossly intact   Consciousness:  alert and awake   Attention: attention span and concentration are age appropriate   Insight:  Appropriate to fair   Judgment: appropriate    Gait/Station: normal   Motor Activity: no abnormal movements     There were no vitals filed for this visit.      Medications:     Current Outpatient Medications on File Prior to Visit   Medication Sig Dispense Refill   • cloNIDine (CATAPRES) 0.1 mg tablet Take 1 tablet (0.1 mg total) by mouth daily at bedtime 1-3 tablet by mouth at night, PRN 90 tablet 1   • cloNIDine (CATAPRES) 0.3 mg tablet Clonidine 0.3m- 1.5 tablets by mouth per day at night . 45 tablet 1   • divalproex sodium (Depakote) 250 mg EC tablet Divalproex Sodium 250m tablet + 500mg po at night 30 tablet 1   • divalproex sodium (Depakote) 500 mg EC tablet Divalproex Sodium 500m tablet + 250mg tablet po at night 30 tablet 1   • traZODone (DESYREL) 300 MG tablet Trazodone 300m tablet po at night 30 tablet 2     No current facility-administered medications on file prior to visit. Labs   [facility]- paper form- reviewed, also in [facility] prior chart   WNL 2023- WNL CBC , CMP, lipids,   23- Depakote level 79  ___________________________________________________________________________________________________________________  A/P  Chase Little, is a 34 y.o., [place] male, h/o trauma , depression, anxiety, alcohol dependency , prior inpatient; IOP for addiction (alcohol)-completion of program.  Interim events of acute  bipolar karis, depression and anxiety . Slight improvement with low dose latuda. Recently increased Depakote- in therapeutic level, although findings of acute karis (  w/ low sleep, pressured dialouge, distractability )     DSM5   Bipolar I disorder, current karis, without psychotic features, moderate severe anxious distress  Generalized Anxiety   Panic Disorder      PLANS  History , external records were reviewed. Discussed clinical findings and diagnotic  impression karis, anxiety, low sleep   Depakote level - therapeutic range. Discussed increased dose vs started another medication for mood stabilization   Started Haldol 5mg to 10mg  Started Gabapentin 600mg QHS and   Depakote 1000QHS  Trazodone 300mg PRN    Medications Prescribed During Encounter at [facility]:    -     haloperidol (HALDOL) 5 mg tablet; Haloperidol 5m tablet po daily at night x 1 wk.  Then 2 tablet po daily at night  -     gabapentin (Neurontin) 600 MG tablet; Gabapentin 600m tablet po daily at night for sleep        Risks, benefits, and possible side effects of medications explained to patient and patient verbalizes understanding. Next Appointment: 4-6 wk    Today's Appointment@ [facility]  Face To Face:   1:35PM- 2:06PM ;Documentation Time: 3:49PM- 4:10PM    Encounter Duration: Time Spent 31 minutes with Patient. Greater than 50% of total time was spent with the patient           MDM  Number of Diagnoses or Management Options  Bipolar disorder, current episode manic without psychotic features, severe ([address]): established, worsening  Generalized anxiety disorder: new, no workup     Amount and/or Complexity of Data Reviewed  Clinical lab tests: reviewed  Review and summarize past medical records: yes    Risk of Complications, Morbidity, and/or Mortality  Presenting problems: moderate  Diagnostic procedures: moderate  Management options: moderate

## 2023-08-25 NOTE — PATIENT INSTRUCTIONS
Adele Izaguirre [phone]   Thanks for presenting to today's Appointment at [address] was started   Gabapentin was started  Your other  medications were not changed

## 2023-09-15 ENCOUNTER — APPOINTMENT (RX ONLY)
Dept: URBAN - METROPOLITAN AREA CLINIC 28 | Facility: CLINIC | Age: 38
Setting detail: DERMATOLOGY
End: 2023-09-15

## 2023-09-15 ENCOUNTER — OFFICE VISIT (OUTPATIENT)
Dept: PSYCHIATRY | Facility: CLINIC | Age: 38
End: 2023-09-15
Payer: COMMERCIAL

## 2023-09-15 DIAGNOSIS — F31.73 BIPOLAR I DISORDER, CURRENT OR MOST RECENT EPISODE MANIC, IN PARTIAL REMISSION (HCC): ICD-10-CM

## 2023-09-15 DIAGNOSIS — F32.A DEPRESSION, UNSPECIFIED DEPRESSION TYPE: Primary | ICD-10-CM

## 2023-09-15 DIAGNOSIS — L21.8 OTHER SEBORRHEIC DERMATITIS: ICD-10-CM | Status: WELL CONTROLLED

## 2023-09-15 DIAGNOSIS — L85.3 XEROSIS CUTIS: ICD-10-CM

## 2023-09-15 PROCEDURE — ? RECOMMENDATIONS

## 2023-09-15 PROCEDURE — ? PRESCRIPTION

## 2023-09-15 PROCEDURE — 99214 OFFICE O/P EST MOD 30 MIN: CPT | Performed by: PSYCHIATRY & NEUROLOGY

## 2023-09-15 PROCEDURE — ? PRESCRIPTION MEDICATION MANAGEMENT

## 2023-09-15 PROCEDURE — 99213 OFFICE O/P EST LOW 20 MIN: CPT

## 2023-09-15 PROCEDURE — ? COUNSELING

## 2023-09-15 RX ORDER — DIVALPROEX SODIUM 500 MG/1
TABLET, DELAYED RELEASE ORAL
Qty: 180 TABLET | Refills: 0 | Status: SHIPPED | OUTPATIENT
Start: 2023-09-15

## 2023-09-15 RX ORDER — LURASIDONE HYDROCHLORIDE 60 MG/1
TABLET, FILM COATED ORAL
Qty: 90 TABLET | Refills: 0 | Status: SHIPPED | OUTPATIENT
Start: 2023-09-15

## 2023-09-15 RX ORDER — TRAZODONE HYDROCHLORIDE 300 MG/1
TABLET ORAL
Qty: 90 TABLET | Refills: 0 | Status: SHIPPED | OUTPATIENT
Start: 2023-09-15

## 2023-09-15 RX ORDER — AMMONIUM LACTATE 12 %
LOTION (GRAM) TOPICAL QDAY
Qty: 227 | Refills: 5 | Status: ERX | COMMUNITY
Start: 2023-09-15

## 2023-09-15 RX ORDER — GABAPENTIN 600 MG/1
TABLET ORAL
Qty: 90 TABLET | Refills: 0 | Status: SHIPPED | OUTPATIENT
Start: 2023-09-15

## 2023-09-15 RX ORDER — HYDROCORTISONE 25 MG/ML
1 LOTION TOPICAL QHS
Qty: 118 | Refills: 5 | Status: ERX

## 2023-09-15 RX ORDER — KETOCONAZOLE 20 MG/ML
1 SHAMPOO, SUSPENSION TOPICAL
Qty: 120 | Refills: 5 | Status: ERX

## 2023-09-15 RX ADMIN — Medication: at 00:00

## 2023-09-15 ASSESSMENT — LOCATION SIMPLE DESCRIPTION DERM
LOCATION SIMPLE: RIGHT UPPER BACK
LOCATION SIMPLE: RIGHT CHEEK
LOCATION SIMPLE: LEFT CHEEK
LOCATION SIMPLE: SCALP

## 2023-09-15 ASSESSMENT — LOCATION ZONE DERM
LOCATION ZONE: FACE
LOCATION ZONE: TRUNK
LOCATION ZONE: SCALP

## 2023-09-15 ASSESSMENT — LOCATION DETAILED DESCRIPTION DERM
LOCATION DETAILED: LEFT CENTRAL MALAR CHEEK
LOCATION DETAILED: LEFT SUPERIOR PARIETAL SCALP
LOCATION DETAILED: RIGHT SUPERIOR MEDIAL UPPER BACK
LOCATION DETAILED: RIGHT CENTRAL MALAR CHEEK

## 2023-09-15 NOTE — LETTER
September 15, 2023     Patient: Karthik Cooper  YOB: 1985  Date of Visit: 9/15/2023      To Whom it May Concern:    Karthik Cooper is under psychiatric care at [facility] and was recently evaluated on  9/15/2023. Sukhmeet  has diagnotic finding of mood instability and depression and is in the mist of treatment adjustments. There's its was advised and to take time off of work to distress. Recommendation also includes surrounding himself with social and community supports if available.        If you have any questions , please don't hesitate to call [facility]         Sincerely,          Mikel Lopez, DO ( she/her)  Psychiatrist Physician   [facility]

## 2023-09-15 NOTE — PROCEDURE: RECOMMENDATIONS
Detail Level: Zone
Render Risk Assessment In Note?: no
Recommendation Preamble: The following recommendations were made during the visit:
Recommendations (Free Text): Apply lotion AFTER shower.

## 2023-09-15 NOTE — LETTER
September 15, 2023     Patient: Niall Dalton  YOB: 1985  Date of Visit: 9/15/2023      To Whom it May Concern:    Niall Dalton is under psychiatric care at [address] [facility] and was recently evaluated on  9/15/2023. Jomar  has diagnotic finding of mood instability and depression and is in the mist of treatment adjustments. There's its was advised and to take time off of work to distress. Recommendation also includes surrounding himself with social and community supports if available.        If you have any questions , please don't hesitate to call [address] [facility]         Sincerely,          Lois Esteves, DO ( she/her)  Psychiatrist Physician   [address] [place] [address]

## 2023-09-15 NOTE — PSYCH
[facility]: [facility]- Non Addiciton  [facility], [address]      Psychiatric Progress Note  MRN#: [id number]  Jomar Barahona 34 y.o. male    This note was not shared with the patient due to reasonable likelihood of causing patient harm   _________________________________________________________________________________________________________________________________  OFFICE APPOINTMENT   Seen today at [address] [facility] location                                                Patient Fernanda Kingsley ,1985   Prescriber/Physician: Daylin Mace DO Physician Location:   [address] [facility] [id number]     • This service was provided in the office. Patient is currently located in the [place], where I am  licensed. • Patient gave consent to proceed with encounter; acknowledge understanding of security and privacy of encounter   • Patient identity was verified as well as the [facility] chart  • Patient verbalized understanding evaluation only involves Psychiatric diagnosing, prescribing, result monitoring   • Patient was informed this is a billable service and legal  ___________________________________________________________________________________________________________________________________         Chief Complaint   Patient presents with   • Depression     Subjective:  Jomar tried Haldol 5mg ; had  s/e - low BP similar to when on Clonidine and dizzines, lightheadedness, therefore never increased to Haldol 10mg, instead  Stopped Haldol. While Gabapentin 600mg was started, devoid of s/e; reports of improved sleep , duration of 6 hrs while on Gabapentin + Trazodone. Otherwise, he complained of depressed mood, low and desire to travel and spend time with family and friends in [place]. Jomar plans to travel within the next few wks.  Requested 3 month supply of medications and excuse absence letter to show this employers      ROS:   Depression-  He stated , severity as  6-7out of 10; normal or lower mood, low appetite, not wanting to get up, and isolativeness, now he wishes his family was around and " not wanting to live in the dark" . Theres fear of worst depression . SI/Hi- denies  Daphnie -not recently within in 1 month   Psychosis- denies delusion and hallucination    Medication: Jomar reported he's compliant to Depakote 500mg x 2, Latuda 40mg, Trazodone and Gabapentin  Med s/e- D/c Hadol- dizziness, lightheadness    Medical ROS: Defer to HPI for pertinent positive/findings , otherwise negative       Medication Trails:  Klonopin . 25mg  Clonidine- low BP   Quetiapine -could not sleep  Wellbutrin- " on for a few months- was not working"  Prozac- was not working   Lexapro- worse anxiety       Mental Status Evaluation:   General Appearance:  David Benitez, is a 34 y.o.. [place]  male looks stated age appropriately dressed,  and nondisheveled    Behavior:  cooperative, normal,  eye contact, moderate anxious appearing    Speech:  Less pressured  and WNL tone, volume,   Mood:  Depressed    Affect:  Anxious  appearing   Thought Process:  Logical , normal    Thought Content:  no overt delusions, normal,   Perceptual Disturbances: Denies auditory and visual hallucinations. Does not appear preoccupied or responding  Delusions  w/o   Risk Potential: Suicidal Ideations w/o  Homicidal Ideations w/o  Potential for Aggression No     Sensorium:  person, place, time/date and situation  (location [facility], month September , year 2023)   Memory:  recent and remote memory grossly intact   Consciousness:  alert and awake   Attention: attention span and concentration are age appropriate   Insight:  Appropriate to fair   Judgment: appropriate    Gait/Station: normal   Motor Activity: no abnormal movements     There were no vitals filed for this visit.      Current Outpatient Medications on File Prior to Visit   Medication Sig Dispense Refill   • divalproex sodium (Depakote) 500 mg DR tablet Divalproex Sodium 500m tablets po at night 60 tablet 1   • gabapentin (Neurontin) 600 MG tablet Gabapentin 600m tablet po daily at night for sleep 30 tablet 2   • haloperidol (HALDOL) 5 mg tablet Haloperidol 5m tablet po daily at night x 1 wk. Then 2 tablet po daily at night 60 tablet 1   • lurasidone (Latuda) 40 mg tablet Lurasidone 40m tablet daily with dinner 30 tablet 1   • traZODone (DESYREL) 300 MG tablet Trazodone 300m tablet po at night 30 tablet 2     No current facility-administered medications on file prior to visit. Labs   [facility]- paper form- reviewed, also in [facility] prior chart   WNL 2023- WNL CBC , CMP, lipids,   23- Depakote level 79  ___________________________________________________________________________________________________________________  A/P  Chase Little, is a 34 y.o., [place] male, h/o trauma , depression, anxiety, alcohol dependency , prior inpatient; IOP for addiction (alcohol)-completion of program, presented with acute  bipolar karis, depression and anxiety . Slight improvement with adjusted dose of  Depakote and latuda. Interim events of  acute karis and adverse effects to Haldol . Positive are noticeable recent improvement, less manic appearing , although reports of depression, not meeting completed criteria for MDD.      DSM5   Unspecified Depressive Disorder   Bipolar I disorder, current or most recent episode manic, in partial remission   Generalized Anxiety   Panic Disorder      PLANS  History , external records were reviewed.  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 Next Appointment: 3 months    Today's Appointment@ [facility]  Face To Face:   1:07PM- 1:35PM  ;Documentation Time: 3:00PM- 3:30PM    Encounter Duration: Time Spent 32 mins with Patient. Greater than 50% of total time was spent with the patient       MDM  Number of Diagnoses or Management Options  Bipolar I disorder, current or most recent episode manic, in partial remission ([address]): established, improving  Depression, unspecified depression type: established, worsening     Amount and/or Complexity of Data Reviewed  Review and summarize past medical records: yes    Risk of Complications, Morbidity, and/or Mortality  Presenting problems: moderate  Diagnostic procedures: moderate  Management options: moderate         This note may have been written with the assistance of dictation software.  Please excuse any grammatical  errors, misspellings,  and abnormal spacing of letters , sentences or paragraphs

## 2023-09-15 NOTE — PATIENT INSTRUCTIONS
Juan Manuel Tao [phone]   Thanks for presenting to today's Appointment at [address] [place] [address] was increased to 60mg   Your medications were not changed

## 2023-09-15 NOTE — PROCEDURE: PRESCRIPTION MEDICATION MANAGEMENT
Continue Regimen: ketoconazole 2% shampoo: Lather into face and scalp at each wash, let sit for 2-3 minutes then rinse out\\nhydrocortisone 2.5% lotion: Apply a thin layer to scalp QHS PRN itch\\nOTC Tea Tree Oil
Detail Level: Zone
Render In Strict Bullet Format?: No
Detail Level: Generalized
Initiate Treatment: ammonium lactate 12 % lotion: Apply a thin layer to body after shower QDAY

## 2024-05-04 ENCOUNTER — DOCUMENTATION (OUTPATIENT)
Dept: BEHAVIORAL/MENTAL HEALTH CLINIC | Facility: CLINIC | Age: 39
End: 2024-05-04

## 2024-05-04 DIAGNOSIS — F33.1 MAJOR DEPRESSIVE DISORDER, RECURRENT, MODERATE (HCC): Primary | ICD-10-CM

## 2024-05-04 NOTE — PROGRESS NOTES
Psychotherapy Discharge Summary    Preferred Name: Jomar Barahona  YOB: 1985    Admission date to psychotherapy: 12/15/22    Referred by: self    Presenting Problem: depression    Course of treatment included : individual therapy     Progress/Outcome of Treatment Goals (brief summary of course of treatment) open communication and established goals relating to life and emotional staility    Treatment Complications (if any): na    Treatment Progress: fair    Current [facility] Psychiatric Provider: Kristen Ortiz    Discharge Medications include: na    Discharge Date: 5/4/24    Discharge Diagnosis:   1. Major depressive disorder, recurrent, moderate (HCC)            Criteria for Discharge:  has not been seen in more than three months    Aftercare recommendations include (include specific referral names and phone numbers, if appropriate): Return to therapy should symptoms worsen or intensify    Prognosis: fair

## 2024-07-23 ENCOUNTER — TELEPHONE (OUTPATIENT)
Dept: PSYCHIATRY | Facility: CLINIC | Age: 39
End: 2024-07-23

## 2024-07-23 NOTE — TELEPHONE ENCOUNTER
LVM regarding whether or not client is still in need of services.  Clt has not been seen in almost a year.  Prior clt of Dr. Churchill.

## 2024-08-06 NOTE — PROGRESS NOTES
Psychiatric Discharge Summary- Administrative Discharge     Admit Date: See H&P for admit date  Discharge Date: 24     Discharge Diagnosis:   Patient Active Problem List   Diagnosis    Major depressive disorder, recurrent, moderate ([facility])        Treating Physician: Dr. Donna Churchill DO      Presenting Problems/Pertinent Findings: See Initial H&P     Course of Treatment:See Most Recent Med Management Progress Note    The patient's primary treating provider is no longer with practice.  Patient has either not responded to outreach, has not been seen in almost a year, last seen 9/15/2023.  The chart is being administratively closed at this time.  For treatment course, please request past records when patient was in treatment with primary provider..      Discharge Medications:   Current Outpatient Medications:     divalproex sodium (Depakote) 500 mg DR tablet, Divalproex Sodium 500m tablets po at night, Disp: 180 tablet, Rfl: 0    gabapentin (Neurontin) 600 MG tablet, Gabapentin 600m tablet po daily at night for sleep, Disp: 90 tablet, Rfl: 0    lurasidone (Latuda) 40 mg tablet, Lurasidone 40m tablet daily with dinner, Disp: 30 tablet, Rfl: 1    Lurasidone HCl (Latuda) 60 MG TABS, Latuda 60m tablet po daily with dinner ( ~ 350 caloric diet), Disp: 90 tablet, Rfl: 0    traZODone (DESYREL) 300 MG tablet, Trazodone 300m tablet po at night, Disp: 90 tablet, Rfl: 0